# Patient Record
Sex: MALE | Race: BLACK OR AFRICAN AMERICAN | NOT HISPANIC OR LATINO | Employment: FULL TIME | URBAN - METROPOLITAN AREA
[De-identification: names, ages, dates, MRNs, and addresses within clinical notes are randomized per-mention and may not be internally consistent; named-entity substitution may affect disease eponyms.]

---

## 2017-05-08 ENCOUNTER — HOSPITAL ENCOUNTER (EMERGENCY)
Facility: HOSPITAL | Age: 32
Discharge: HOME | End: 2017-05-08
Attending: EMERGENCY MEDICINE

## 2017-05-08 VITALS
OXYGEN SATURATION: 99 % | DIASTOLIC BLOOD PRESSURE: 61 MMHG | RESPIRATION RATE: 16 BRPM | SYSTOLIC BLOOD PRESSURE: 120 MMHG | WEIGHT: 190 LBS | HEART RATE: 76 BPM | HEIGHT: 66 IN | TEMPERATURE: 98 F | BODY MASS INDEX: 30.53 KG/M2

## 2017-05-08 DIAGNOSIS — R11.2 NAUSEA VOMITING AND DIARRHEA: ICD-10-CM

## 2017-05-08 DIAGNOSIS — R10.9 ABDOMINAL PAIN: ICD-10-CM

## 2017-05-08 DIAGNOSIS — R19.7 NAUSEA VOMITING AND DIARRHEA: ICD-10-CM

## 2017-05-08 DIAGNOSIS — R10.84 GENERALIZED ABDOMINAL PAIN: Primary | ICD-10-CM

## 2017-05-08 LAB
ALBUMIN SERPL BCP-MCNC: 4.2 G/DL
ALP SERPL-CCNC: 83 U/L
ALT SERPL W/O P-5'-P-CCNC: 26 U/L
ANION GAP SERPL CALC-SCNC: 9 MMOL/L
AST SERPL-CCNC: 32 U/L
BASOPHILS # BLD AUTO: 0 K/UL
BASOPHILS NFR BLD: 0 %
BILIRUB SERPL-MCNC: 1 MG/DL
BILIRUB UR QL STRIP: NEGATIVE
BUN SERPL-MCNC: 15 MG/DL
CALCIUM SERPL-MCNC: 9.9 MG/DL
CHLORIDE SERPL-SCNC: 105 MMOL/L
CLARITY UR: CLEAR
CO2 SERPL-SCNC: 26 MMOL/L
COLOR UR: YELLOW
CREAT SERPL-MCNC: 1.2 MG/DL
DIFFERENTIAL METHOD: ABNORMAL
EOSINOPHIL # BLD AUTO: 0.1 K/UL
EOSINOPHIL NFR BLD: 3.2 %
ERYTHROCYTE [DISTWIDTH] IN BLOOD BY AUTOMATED COUNT: 12.5 %
EST. GFR  (AFRICAN AMERICAN): >60 ML/MIN/1.73 M^2
EST. GFR  (NON AFRICAN AMERICAN): >60 ML/MIN/1.73 M^2
GLUCOSE SERPL-MCNC: 105 MG/DL
GLUCOSE UR QL STRIP: NEGATIVE
HCT VFR BLD AUTO: 43.8 %
HGB BLD-MCNC: 15.7 G/DL
HGB UR QL STRIP: NEGATIVE
KETONES UR QL STRIP: NEGATIVE
LEUKOCYTE ESTERASE UR QL STRIP: NEGATIVE
LIPASE SERPL-CCNC: 16 U/L
LYMPHOCYTES # BLD AUTO: 1.2 K/UL
LYMPHOCYTES NFR BLD: 30.1 %
MCH RBC QN AUTO: 32 PG
MCHC RBC AUTO-ENTMCNC: 35.8 %
MCV RBC AUTO: 89 FL
MONOCYTES # BLD AUTO: 0.2 K/UL
MONOCYTES NFR BLD: 4.6 %
NEUTROPHILS # BLD AUTO: 2.6 K/UL
NEUTROPHILS NFR BLD: 61.9 %
NITRITE UR QL STRIP: NEGATIVE
PH UR STRIP: >8 [PH] (ref 5–8)
PLATELET # BLD AUTO: 211 K/UL
PMV BLD AUTO: 11.1 FL
POTASSIUM SERPL-SCNC: 3.7 MMOL/L
PROT SERPL-MCNC: 9 G/DL
PROT UR QL STRIP: ABNORMAL
RBC # BLD AUTO: 4.9 M/UL
SODIUM SERPL-SCNC: 140 MMOL/L
SP GR UR STRIP: 1.01 (ref 1–1.03)
URN SPEC COLLECT METH UR: ABNORMAL
UROBILINOGEN UR STRIP-ACNC: 1 EU/DL
WBC # BLD AUTO: 4.12 K/UL

## 2017-05-08 PROCEDURE — 96372 THER/PROPH/DIAG INJ SC/IM: CPT

## 2017-05-08 PROCEDURE — 63600175 PHARM REV CODE 636 W HCPCS: Performed by: NURSE PRACTITIONER

## 2017-05-08 PROCEDURE — 83690 ASSAY OF LIPASE: CPT

## 2017-05-08 PROCEDURE — 81003 URINALYSIS AUTO W/O SCOPE: CPT

## 2017-05-08 PROCEDURE — 80053 COMPREHEN METABOLIC PANEL: CPT

## 2017-05-08 PROCEDURE — 25000003 PHARM REV CODE 250: Performed by: NURSE PRACTITIONER

## 2017-05-08 PROCEDURE — 99284 EMERGENCY DEPT VISIT MOD MDM: CPT | Mod: 25

## 2017-05-08 PROCEDURE — 96374 THER/PROPH/DIAG INJ IV PUSH: CPT

## 2017-05-08 PROCEDURE — 85025 COMPLETE CBC W/AUTO DIFF WBC: CPT

## 2017-05-08 PROCEDURE — 96361 HYDRATE IV INFUSION ADD-ON: CPT

## 2017-05-08 RX ORDER — ONDANSETRON 4 MG/1
4 TABLET, ORALLY DISINTEGRATING ORAL EVERY 8 HOURS PRN
Qty: 10 TABLET | Refills: 0 | Status: SHIPPED | OUTPATIENT
Start: 2017-05-08

## 2017-05-08 RX ORDER — DICYCLOMINE HYDROCHLORIDE 10 MG/ML
20 INJECTION INTRAMUSCULAR
Status: COMPLETED | OUTPATIENT
Start: 2017-05-08 | End: 2017-05-08

## 2017-05-08 RX ORDER — ONDANSETRON 2 MG/ML
4 INJECTION INTRAMUSCULAR; INTRAVENOUS
Status: COMPLETED | OUTPATIENT
Start: 2017-05-08 | End: 2017-05-08

## 2017-05-08 RX ORDER — DICYCLOMINE HYDROCHLORIDE 20 MG/1
20 TABLET ORAL 2 TIMES DAILY
Qty: 20 TABLET | Refills: 0 | Status: SHIPPED | OUTPATIENT
Start: 2017-05-08 | End: 2017-06-07

## 2017-05-08 RX ADMIN — DICYCLOMINE HYDROCHLORIDE 20 MG: 20 INJECTION, SOLUTION INTRAMUSCULAR at 12:05

## 2017-05-08 RX ADMIN — ONDANSETRON 4 MG: 2 INJECTION INTRAMUSCULAR; INTRAVENOUS at 12:05

## 2017-05-08 RX ADMIN — SODIUM CHLORIDE 1000 ML: 0.9 INJECTION, SOLUTION INTRAVENOUS at 12:05

## 2017-05-08 NOTE — ED NOTES
Midline abdominal pain with n/v and diarrhea that started this am.  Reports vomited approx 5 times at home with 2 episodes of loose stools today.  Also reports throat became sore after vomiting and noticed blood tinge in vomit after repeated vomiting.  Denies fever or any other symptoms.  Reports was drinking alcohol yesterday.

## 2017-05-08 NOTE — ED PROVIDER NOTES
"Encounter Date: 5/8/2017       History     Chief Complaint   Patient presents with    Abdominal Pain     abdominal pain, nausea, vomiting, and diarrhea     Review of patient's allergies indicates:  No Known Allergies  HPI Comments: 30yo male with no known health history is here for abd pain, n/v/d.  Pt states he awoke with the generalized abd pain this morning.  He denies trauma, fever, cough, hematemesis, rectal bleeding, dysuria, and penile discharge.  He reports that yesterday he did have fried fish and drink some alcohol, and he noticed the pain only this morning.  No known sick contacts.  He has tried nothing for his symptoms.  Pt reports that after vomiting several times, he noticed "little specks of blood" in the emesis.  He denies history of ulcers and frequent NSAID use.      Patient is a 31 y.o. male presenting with the following complaint: abdominal pain. The history is provided by the patient.   Abdominal Pain   The current episode started 1 to 2 hours ago. The onset of the illness was gradual. The problem has not changed since onset.The abdominal pain is generalized. The abdominal pain does not radiate. The severity of the abdominal pain is 6/10. The abdominal pain is relieved by bowel movement. Exacerbated by: nothing. The other symptoms of the illness include nausea, vomiting and diarrhea. The other symptoms of the illness do not include fever, fatigue, shortness of breath or dysuria.   Symptoms associated with the illness do not include constipation, hematuria or back pain.     History reviewed. No pertinent past medical history.  History reviewed. No pertinent surgical history.  History reviewed. No pertinent family history.  Social History   Substance Use Topics    Smoking status: Never Smoker    Smokeless tobacco: None    Alcohol use Yes      Comment: social     Review of Systems   Constitutional: Negative for activity change, fatigue and fever.   HENT: Negative for congestion and sore throat. "    Respiratory: Negative for cough, chest tightness and shortness of breath.    Cardiovascular: Negative for chest pain.   Gastrointestinal: Positive for abdominal pain, diarrhea, nausea and vomiting. Negative for abdominal distention, blood in stool and constipation.   Genitourinary: Negative for dysuria and hematuria.   Musculoskeletal: Negative for back pain, joint swelling, myalgias and neck pain.   Skin: Negative.    Neurological: Negative for weakness and headaches.   Psychiatric/Behavioral: Negative for confusion.   All other systems reviewed and are negative.      Physical Exam   Initial Vitals   BP Pulse Resp Temp SpO2   05/08/17 1045 05/08/17 1045 05/08/17 1045 05/08/17 1045 05/08/17 1045   130/80 80 16 97.8 °F (36.6 °C) 99 %     Physical Exam    Nursing note and vitals reviewed.  Constitutional: Vital signs are normal. He appears well-developed and well-nourished. He is active and cooperative. He is easily aroused.  Non-toxic appearance. He does not have a sickly appearance. He does not appear ill. No distress.   HENT:   Head: Normocephalic and atraumatic.   Eyes: Conjunctivae are normal.   Neck: Normal range of motion.   Cardiovascular: Normal rate, regular rhythm and normal heart sounds.   Pulmonary/Chest: Effort normal and breath sounds normal.   Abdominal: Soft. Normal appearance and bowel sounds are normal. He exhibits no distension. There is generalized tenderness. There is no rigidity, no rebound, no guarding and no CVA tenderness.   Neurological: He is alert, oriented to person, place, and time and easily aroused. GCS eye subscore is 4. GCS verbal subscore is 5. GCS motor subscore is 6.   Skin: Skin is warm, dry and intact. No bruising and no rash noted.   Psychiatric: He has a normal mood and affect. His speech is normal and behavior is normal. Judgment and thought content normal. Cognition and memory are normal.         ED Course   Procedures  Labs Reviewed   COMPREHENSIVE METABOLIC PANEL -  Abnormal; Notable for the following:        Result Value    Total Protein 9.0 (*)     All other components within normal limits   CBC W/ AUTO DIFFERENTIAL - Abnormal; Notable for the following:     MCH 32.0 (*)     Mono # 0.2 (*)     All other components within normal limits   URINALYSIS - Abnormal; Notable for the following:     pH, UA >8.0 (*)     Protein, UA Trace (*)     All other components within normal limits   LIPASE         Imaging Results         X-Ray Abdomen Flat And Erect (Final result) Result time:  05/08/17 12:32:57    Final result by Jung Olea MD (05/08/17 12:32:57)    Impression:       No acute process.      Electronically signed by: JUNG OLEA MD  Date:     05/08/17  Time:    12:32     Narrative:    [id number]  Accession # [id number]    Study:  XR ABDOMEN FLAT AND ERECT    Indication: Abdominal pain.    Comparison: None.    Findings:   Abdomen flat and erect.    No dilated loops of small bowel.  Nonobstructive bowel gas pattern.  No free air under the diaphragm.  Lung bases are clear.  Osseous structures are unremarkable.                   Medical Decision Making:   Initial Assessment:   32yo male here for abd pain with n/v/d.  No fever, hematemesis, rectal bleeding, dysuria, or hematuria.  Pt appears well, nontoxic.  Abd soft, generalized tenderness, no r/r/g, no distention, bowel sounds normoactive. No CVA tenderness. No signs of trauma.    Differential Diagnosis:   Gastroenteritis, electrolyte derangement, dehydration, UTI, pancreatitis, cholecystitis, appendicitis.   Clinical Tests:   Lab Tests: Ordered and Reviewed  Radiological Study: Ordered and Reviewed  ED Management:  Labs, IV fluids, Abd Xray, IM Bentyl, Zofran  Labs unremarkable.  Xray negative for acute changes.  Pt reports improvement of abd pain.  He is tolerating PO and requesting food and discharge.  I feel the pt has viral gastroenteritis.  No indication of bacterial infection.  Advised clear liquid diet for 24 hours.   F/u with PCP within 2 days and return to the ED if condition changes, progresses, or if there are concerns.  RX Bentyl and Zofran.  Pt verbalized understanding, compliance, and agreement with treatment plan.                Attending Attestation:     Physician Attestation Statement for NP/PA:   I discussed this assessment and plan of this patient with the NP/PA, but I did not personally examine the patient. The face to face encounter was performed by the NP/PA.                  ED Course     Clinical Impression:   The primary encounter diagnosis was Generalized abdominal pain. Diagnoses of Abdominal pain and Nausea vomiting and diarrhea were also pertinent to this visit.    Disposition:   Disposition: Discharged  Condition: Stable       VAL Do  05/08/17 1620       Sushila Singh MD  05/15/17 4937

## 2017-05-08 NOTE — ED AVS SNAPSHOT
[facility]  [address]  [facility] [address]               Rian Felice Lyndon Richter   2017 11:44 AM   ED    Description:  Male : 1985   Department:  [facility]           Your Care was Coordinated By:     Provider Role From To    VAL Do Nurse Practitioner 17 1155 --      Reason for Visit     Abdominal Pain           Diagnoses this Visit        Comments    Generalized abdominal pain    -  Primary     Abdominal pain         Nausea vomiting and diarrhea           ED Disposition     None           To Do List           Follow-up Information     Follow up with [facility]. Schedule an appointment as soon as possible for a visit in 2 days.    Specialty:  Family Medicine    Contact information:    [address], [address]  [facility] [address] [phone]       These Medications        Disp Refills Start End    ondansetron (ZOFRAN-ODT) 4 MG TbDL 10 tablet 0 2017     Take 1 tablet (4 mg total) by mouth every 8 (eight) hours as needed. - Oral    dicyclomine (BENTYL) 20 mg tablet 20 tablet 0 2017    Take 1 tablet (20 mg total) by mouth 2 (two) times daily. PRN abd pain - Oral      [facility]     [facility] Nurse Care Line -  Assistance  Unless otherwise directed by your provider, please contact [facility], our nurse care line that is available for  assistance.     Registered nurses in the [facility] provide: appointment scheduling, clinical advisement, health education, and other advisory services.  Call: [phone] (toll free)               Medications           Message regarding Medications     Verify the changes and/or additions to your medication regime listed below are the same as discussed with your clinician today.  If any of these changes or additions are incorrect, please notify your healthcare provider.        START  "taking these NEW medications        Refills    ondansetron (ZOFRAN-ODT) 4 MG TbDL 0    Sig: Take 1 tablet (4 mg total) by mouth every 8 (eight) hours as needed.    Class: Print    Route: Oral    dicyclomine (BENTYL) 20 mg tablet 0    Sig: Take 1 tablet (20 mg total) by mouth 2 (two) times daily. PRN abd pain    Class: Print    Route: Oral      These medications were administered today        Dose Freq    sodium chloride 0.9% bolus 1,000 mL 1,000 mL ED 1 Time    Sig: Inject 1,000 mLs into the vein ED 1 Time.    Class: Normal    Route: Intravenous    ondansetron injection 4 mg 4 mg ED 1 Time    Sig: Inject 4 mg into the vein ED 1 Time.    Class: Normal    Route: Intravenous    dicyclomine injection 20 mg 20 mg ED 1 Time    Sig: Inject 2 mLs (20 mg total) into the muscle ED 1 Time.    Class: Normal    Route: Intramuscular           Verify that the below list of medications is an accurate representation of the medications you are currently taking.  If none reported, the list may be blank. If incorrect, please contact your healthcare provider. Carry this list with you in case of emergency.           Current Medications     dicyclomine (BENTYL) 20 mg tablet Take 1 tablet (20 mg total) by mouth 2 (two) times daily. PRN abd pain    ondansetron (ZOFRAN-ODT) 4 MG TbDL Take 1 tablet (4 mg total) by mouth every 8 (eight) hours as needed.           Clinical Reference Information           Your Vitals Were     BP Pulse Temp Resp Height Weight    120/61 (BP Location: Right arm, Patient Position: Sitting, BP Method: Automatic) 76 98.3 °F (36.8 °C) (Oral) 16 5' 6" (1.676 m) 86.2 kg (190 lb)    SpO2 BMI             99% 30.67 kg/m2         Allergies as of 5/8/2017     No Known Allergies      Immunizations Administered on Date of Encounter - 5/8/2017     None      ED Micro, Lab, POCT     Start Ordered       Status Ordering Provider    05/08/17 1204 05/08/17 1204  Comprehensive metabolic panel  STAT      Final result     05/08/17 1204 " 05/08/17 1204  CBC auto differential  STAT      Final result     05/08/17 1204 05/08/17 1204  Lipase  STAT      Final result     05/08/17 1204 05/08/17 1204  Urinalysis  STAT      Final result       ED Imaging Orders     Start Ordered       Status Ordering Provider    05/08/17 1205 05/08/17 1204  X-Ray Abdomen Flat And Erect  1 time imaging      Final result         Discharge Instructions         Abdominal Pain    Abdominal pain is pain in the stomach or belly area. Everyone has this pain from time to time. In many cases it goes away on its own. But abdominal pain can sometimes be due to a serious problem, such as appendicitis. So its important to know when to seek help.  Causes of abdominal pain  There are many possible causes of abdominal pain. Common causes in adults include:  · Constipation, diarrhea, or gas  · Stomach acid flowing back up into the esophagus (acid reflux or heartburn)  · Severe acid reflux, called GERD (gastroesophageal reflux disease)  · A sore in the lining of the stomach or small intestine (peptic ulcer)  · Inflammation of the gallbladder, liver, or pancreas  · Gallstones or kidney stones  · Appendicitis   · Intestinal blockage   · An internal organ pushing through a muscle or other tissue (hernia)  · Urinary tract infections  · In women, menstrual cramps, fibroids, or endometriosis  · Inflammation or infection of the intestines  Diagnosing the cause of abdominal pain  Your healthcare provider will do a physical exam help find the cause of your pain. If needed, tests will be ordered. Belly pain has many possible causes. So it can be hard to find the reason for your pain. Giving details about your pain can help. Tell your provider where and when you feel the pain, and what makes it better or worse. Also let your provider know if you have other symptoms such as:  · Fever  · Tiredness  · Upset stomach (nausea)  · Vomiting  · Changes in bathroom habits  Treating abdominal pain  Some causes of  pain need emergency medical treatment right away. These include appendicitis or a bowel blockage. Other problems can be treated with rest, fluids, or medicines. Your healthcare provider can give you specific instructions for treatment or self-care based on what is causing your pain.  If you have vomiting or diarrhea, sip water or other clear fluids. When you are ready to eat solid foods again, start with small amounts of easy-to-digest, low-fat foods. These include apple sauce, toast, or crackers.   When to seek medical care  Call 911 or go to the hospital right away if you:  · Cant pass stool and are vomiting  · Are vomiting blood or have bloody diarrhea or black, tarry diarrhea  · Have chest, neck, or shoulder pain  · Feel like you might pass out  · Have pain in your shoulder blades with nausea  · Have sudden, severe belly pain  · Have new, severe pain unlike any you have felt before  · Have a belly that is rigid, hard, and tender to touch  Call your healthcare provider if you have:  · Pain for more than 5 days  · Bloating for more than 2 days  · Diarrhea for more than 5 days  · A fever of 100.4°F (38.0°C) or higher, or as directed by your provider  · Pain that gets worse  · Weight loss for no reason  · Continued lack of appetite  · Blood in your stool  How to prevent abdominal pain  Here are some tips to help prevent abdominal pain:  · Eat smaller amounts of food at one time.  · Avoid greasy, fried, or other high-fat foods.  · Avoid foods that give you gas.  · Exercise regularly.  · Drink plenty of fluids.  To help prevent GERD symptoms:  · Quit smoking.  · Reduce alcohol and certain foods that increase stomach acid.  · Avoid aspirin and over-the-counter pain and fever medicines (NSAIDS or nonsteroidal anti-inflammatory drugs), if possible  · Lose extra weight.  · Finish eating at least 2 hours before you go to bed or lie down.  · Raise the head of your bed.  Date Last Reviewed: 7/1/2016  © 5242-9747 [employer]  "[employer]. [address] [place] [address], [place] [address]. All rights reserved. This information is not intended as a substitute for professional medical care. Always follow your healthcare professional's instructions.          How to Control Nausea and Vomiting     Taken before meals, medicines can help ease nausea.    Nausea is feeling that you need to throw up. Throwing up occurs when your body forces food that is in your stomach out through your mouth. Nausea and vomiting are symptoms that are caused by many things. They can happen when a condition or disease, medicine, medical treatment, or a poisonous substance affects the area in your brain that controls vomiting. Some conditions or diseases can cause nausea, abdominal pain or cramps, and vomiting. The symptoms can be mild and go away by themselves. Other symptoms can be serious. You will need to see your healthcare provider for these.  Nausea and vomiting are common. They can be caused by many things. These include:  · "Stomach flu" (gastroenteritis)  · Food poisoning  · Stomach pain (gastritis)  · Blockages  They can also be caused by a head injury, an infection in the brain or inside the ear, or migraines. Other common causes of nausea and vomiting include:  · Brain tumor  · Brain bruise  · Motion sickness  · Drugs. These include alcohol, pain medicines such as morphine, and cancer medicines.  · Toxins. These are poisonous things like plants or liquids that are swallowed by accident.  · Advanced types of cancer  · Movement problems (psychogenic problems)  · Extra pressure in the fluid that surrounds the brain and spinal cord (elevated intracranial pressure)     Nausea and vomiting are also common side effects of chemotherapy and radiation therapy. Side effects happen when treatment changes some normal cells as well as cancer cells. In this case, the cells lining your stomach and the part of your brain that controls vomiting are affected. Other more " serious causes of vomiting may be hard to find early in the illness.     When to seek medical advice  Call your healthcare provider right away if you have the following:  · Nausea or vomiting that lasts 24 hours or more  · Trouble keeping fluids down   Medicines can help  Nausea or vomiting can often be prevented or controlled with medicines (antiemetics). Your doctor may give you antiemetics before or after treatment if you are getting chemotherapy or other medical treatments that cause nausea or vomiting.  Eating tips  · If you have medicines to control nausea, take them before meals as directed.  · Avoid fatty or greasy foods while nauseated.  · Eat small meals slowly throughout the day.  · Ask someone to sit with you while you eat to keep you from thinking about feeling nauseated.  · Eat foods at room temperature or colder to avoid strong smells.  · Eat dry foods, such as toast, crackers, or pretzels. Also eat cool, light foods, such as applesauce, and bland foods, such as oatmeal or skinned chicken.   Other ways to feel better  · Get a little fresh air. Take a short walk.  · Talk to a friend, listen to music, or watch TV.  · Take a few deep, slow breaths.  · Eat by candlelight or in surroundings that you find relaxing.  · Use a technique, such as guided imagery, to help you relax. Imagine yourself in a beautiful, restful scene. Or daydream about the place youd most like to be.  Date Last Reviewed: 1/6/2016  © 2133-0453 [employer]. [address] [place] [address], [place] [address]. All rights reserved. This information is not intended as a substitute for professional medical care. Always follow your healthcare professional's instructions.          Self-Care for Vomiting and Diarrhea    Vomiting and diarrhea can make you miserable. Your stomach and bowels are reacting to an irritant. This might be food, medicine, or viral stomach flu. Vomiting and diarrhea are two ways your body can remove the problem  from your system. Nausea is a symptom that discourages you from eating. This gives your stomach and bowels time to recover. To get back to normal, start with self-care to ease your discomfort.  Drink liquids  Drink or sip liquids to avoid losing too much fluid (dehydration):  · Clear liquids such as water or broth are the best choices.  · Do not drink beverages with a lot of sugar in them, such as juices and sodas. These can make diarrhea worse.  · If you have severe vomiting, do not drink sport drinks, such as electrolyte solutions. These don't have the right mix of water, sugar, and minerals. They can also make the symptoms worse. In this situation, commercially available oral rehydration solutions are best.   · Suck on ice chips if the thought of drinking something makes you queasy.  When youre able to eat again  Tips include the following:  · As your appetite comes back, you can resume your normal diet.  · Ask your healthcare provider whether you should stay away from any foods.  Medicines  Know the following about medicines:  · Vomiting and diarrhea are ways your body uses to rid itself of harmful substances such as bacteria. DO NOT use antidiarrheal or antivomiting (antiemetic) medicines unless your healthcare provider tells you to do so.  · Aspirin, medicine with aspirin, and many aspirin substitutes can irritate your stomach. So avoid them when you have stomach upset.  · Certain prescription and over-the-counter medicines can cause vomiting and diarrhea. Talk with your healthcare provider about any medicines you take that may be causing these symptoms.  · Certain over-the-counter antihistamines can help control nausea. Other medicines can help soothe stomach upset. Ask your healthcare provider which medicines may help you.  When to call your healthcare provider  Call your healthcare provider if you have:  · Bloody or black vomit or stools  · Severe, steady belly pain  · Vomiting with a severe headache or  vomiting after a head injury  · Vomiting and diarrhea together for more than an hour  · An inability to hold down even sips of liquids for more than 12 hours  · Vomiting that lasts more than 24 hours  · Severe diarrhea that lasts more than 2 days  · Yellowish color to your skin or the whites of your eyes  · Inability to urinate. In infants and young children, not making wet diapers.    Date Last Reviewed: 6/1/2016  © 5741-5699 [employer]. [address] [place] [address], [place] [address]. All rights reserved. This information is not intended as a substitute for professional medical care. Always follow your healthcare professional's instructions.          [facility] Sign-Up     Activating your [facility] account is as easy as 1-2-3!     1) Visit [email or web address], select Sign Up Now, enter this activation code and your date of birth, then select Next.  [email or web address]  Expires: 6/22/2017  1:19 PM      2) Create a username and password to use when you visit [facility] in the future and select a security question in case you lose your password and select Next.    3) Enter your e-mail address and click Sign Up!    Additional Information  If you have questions, please e-mail [email or web address] or call [phone] to talk to our [facility] staff. Remember, [facility] is NOT to be used for urgent needs. For medical emergencies, dial 911.          [facility] complies with applicable Federal civil rights laws and does not discriminate on the basis of race, color, national origin, age, disability, or sex.        Language Assistance Services     ATTENTION: Language assistance services are available, free of charge. Please call [phone].      ATENCIÓN: Si habla español, tiene a meehan disposición servicios gratuitos de asistencia lingüística. Llame al [phone].     CHÚ Ý: N?u b?n nói Ti?ng Vi?t, có các d?ch v? h? tr? ngôn ng? mi?n phí dành cho b?n. G?i s? [phone].

## 2017-05-08 NOTE — DISCHARGE INSTRUCTIONS
Abdominal Pain    Abdominal pain is pain in the stomach or belly area. Everyone has this pain from time to time. In many cases it goes away on its own. But abdominal pain can sometimes be due to a serious problem, such as appendicitis. So its important to know when to seek help.  Causes of abdominal pain  There are many possible causes of abdominal pain. Common causes in adults include:  · Constipation, diarrhea, or gas  · Stomach acid flowing back up into the esophagus (acid reflux or heartburn)  · Severe acid reflux, called GERD (gastroesophageal reflux disease)  · A sore in the lining of the stomach or small intestine (peptic ulcer)  · Inflammation of the gallbladder, liver, or pancreas  · Gallstones or kidney stones  · Appendicitis   · Intestinal blockage   · An internal organ pushing through a muscle or other tissue (hernia)  · Urinary tract infections  · In women, menstrual cramps, fibroids, or endometriosis  · Inflammation or infection of the intestines  Diagnosing the cause of abdominal pain  Your healthcare provider will do a physical exam help find the cause of your pain. If needed, tests will be ordered. Belly pain has many possible causes. So it can be hard to find the reason for your pain. Giving details about your pain can help. Tell your provider where and when you feel the pain, and what makes it better or worse. Also let your provider know if you have other symptoms such as:  · Fever  · Tiredness  · Upset stomach (nausea)  · Vomiting  · Changes in bathroom habits  Treating abdominal pain  Some causes of pain need emergency medical treatment right away. These include appendicitis or a bowel blockage. Other problems can be treated with rest, fluids, or medicines. Your healthcare provider can give you specific instructions for treatment or self-care based on what is causing your pain.  If you have vomiting or diarrhea, sip water or other clear fluids. When you are ready to eat solid foods again,  start with small amounts of easy-to-digest, low-fat foods. These include apple sauce, toast, or crackers.   When to seek medical care  Call 911 or go to the hospital right away if you:  · Cant pass stool and are vomiting  · Are vomiting blood or have bloody diarrhea or black, tarry diarrhea  · Have chest, neck, or shoulder pain  · Feel like you might pass out  · Have pain in your shoulder blades with nausea  · Have sudden, severe belly pain  · Have new, severe pain unlike any you have felt before  · Have a belly that is rigid, hard, and tender to touch  Call your healthcare provider if you have:  · Pain for more than 5 days  · Bloating for more than 2 days  · Diarrhea for more than 5 days  · A fever of 100.4°F (38.0°C) or higher, or as directed by your provider  · Pain that gets worse  · Weight loss for no reason  · Continued lack of appetite  · Blood in your stool  How to prevent abdominal pain  Here are some tips to help prevent abdominal pain:  · Eat smaller amounts of food at one time.  · Avoid greasy, fried, or other high-fat foods.  · Avoid foods that give you gas.  · Exercise regularly.  · Drink plenty of fluids.  To help prevent GERD symptoms:  · Quit smoking.  · Reduce alcohol and certain foods that increase stomach acid.  · Avoid aspirin and over-the-counter pain and fever medicines (NSAIDS or nonsteroidal anti-inflammatory drugs), if possible  · Lose extra weight.  · Finish eating at least 2 hours before you go to bed or lie down.  · Raise the head of your bed.  Date Last Reviewed: 7/1/2016  © 4381-0149 [employer]. [address] [place] [address], [place] [address]. All rights reserved. This information is not intended as a substitute for professional medical care. Always follow your healthcare professional's instructions.          How to Control Nausea and Vomiting     Taken before meals, medicines can help ease nausea.    Nausea is feeling that you need to throw up. Throwing up occurs when your  "body forces food that is in your stomach out through your mouth. Nausea and vomiting are symptoms that are caused by many things. They can happen when a condition or disease, medicine, medical treatment, or a poisonous substance affects the area in your brain that controls vomiting. Some conditions or diseases can cause nausea, abdominal pain or cramps, and vomiting. The symptoms can be mild and go away by themselves. Other symptoms can be serious. You will need to see your healthcare provider for these.  Nausea and vomiting are common. They can be caused by many things. These include:  · "Stomach flu" (gastroenteritis)  · Food poisoning  · Stomach pain (gastritis)  · Blockages  They can also be caused by a head injury, an infection in the brain or inside the ear, or migraines. Other common causes of nausea and vomiting include:  · Brain tumor  · Brain bruise  · Motion sickness  · Drugs. These include alcohol, pain medicines such as morphine, and cancer medicines.  · Toxins. These are poisonous things like plants or liquids that are swallowed by accident.  · Advanced types of cancer  · Movement problems (psychogenic problems)  · Extra pressure in the fluid that surrounds the brain and spinal cord (elevated intracranial pressure)     Nausea and vomiting are also common side effects of chemotherapy and radiation therapy. Side effects happen when treatment changes some normal cells as well as cancer cells. In this case, the cells lining your stomach and the part of your brain that controls vomiting are affected. Other more serious causes of vomiting may be hard to find early in the illness.     When to seek medical advice  Call your healthcare provider right away if you have the following:  · Nausea or vomiting that lasts 24 hours or more  · Trouble keeping fluids down   Medicines can help  Nausea or vomiting can often be prevented or controlled with medicines (antiemetics). Your doctor may give you antiemetics before " or after treatment if you are getting chemotherapy or other medical treatments that cause nausea or vomiting.  Eating tips  · If you have medicines to control nausea, take them before meals as directed.  · Avoid fatty or greasy foods while nauseated.  · Eat small meals slowly throughout the day.  · Ask someone to sit with you while you eat to keep you from thinking about feeling nauseated.  · Eat foods at room temperature or colder to avoid strong smells.  · Eat dry foods, such as toast, crackers, or pretzels. Also eat cool, light foods, such as applesauce, and bland foods, such as oatmeal or skinned chicken.   Other ways to feel better  · Get a little fresh air. Take a short walk.  · Talk to a friend, listen to music, or watch TV.  · Take a few deep, slow breaths.  · Eat by candlelight or in surroundings that you find relaxing.  · Use a technique, such as guided imagery, to help you relax. Imagine yourself in a beautiful, restful scene. Or daydream about the place youd most like to be.  Date Last Reviewed: 1/6/2016  © 8517-8909 [employer]. [address] [place] [address], [place] [address]. All rights reserved. This information is not intended as a substitute for professional medical care. Always follow your healthcare professional's instructions.          Self-Care for Vomiting and Diarrhea    Vomiting and diarrhea can make you miserable. Your stomach and bowels are reacting to an irritant. This might be food, medicine, or viral stomach flu. Vomiting and diarrhea are two ways your body can remove the problem from your system. Nausea is a symptom that discourages you from eating. This gives your stomach and bowels time to recover. To get back to normal, start with self-care to ease your discomfort.  Drink liquids  Drink or sip liquids to avoid losing too much fluid (dehydration):  · Clear liquids such as water or broth are the best choices.  · Do not drink beverages with a lot of sugar in them, such as  juices and sodas. These can make diarrhea worse.  · If you have severe vomiting, do not drink sport drinks, such as electrolyte solutions. These don't have the right mix of water, sugar, and minerals. They can also make the symptoms worse. In this situation, commercially available oral rehydration solutions are best.   · Suck on ice chips if the thought of drinking something makes you queasy.  When youre able to eat again  Tips include the following:  · As your appetite comes back, you can resume your normal diet.  · Ask your healthcare provider whether you should stay away from any foods.  Medicines  Know the following about medicines:  · Vomiting and diarrhea are ways your body uses to rid itself of harmful substances such as bacteria. DO NOT use antidiarrheal or antivomiting (antiemetic) medicines unless your healthcare provider tells you to do so.  · Aspirin, medicine with aspirin, and many aspirin substitutes can irritate your stomach. So avoid them when you have stomach upset.  · Certain prescription and over-the-counter medicines can cause vomiting and diarrhea. Talk with your healthcare provider about any medicines you take that may be causing these symptoms.  · Certain over-the-counter antihistamines can help control nausea. Other medicines can help soothe stomach upset. Ask your healthcare provider which medicines may help you.  When to call your healthcare provider  Call your healthcare provider if you have:  · Bloody or black vomit or stools  · Severe, steady belly pain  · Vomiting with a severe headache or vomiting after a head injury  · Vomiting and diarrhea together for more than an hour  · An inability to hold down even sips of liquids for more than 12 hours  · Vomiting that lasts more than 24 hours  · Severe diarrhea that lasts more than 2 days  · Yellowish color to your skin or the whites of your eyes  · Inability to urinate. In infants and young children, not making wet diapers.    Date Last  Reviewed: 6/1/2016  © 0438-9342 [employer], [employer]. [address] [place] [address], [place] [address]. All rights reserved. This information is not intended as a substitute for professional medical care. Always follow your healthcare professional's instructions.

## 2024-10-02 PROBLEM — Z00.00 ENCOUNTER FOR PREVENTIVE HEALTH EXAMINATION: Status: ACTIVE | Noted: 2024-10-02

## 2024-10-04 ENCOUNTER — APPOINTMENT (OUTPATIENT)
Dept: PLASTIC SURGERY | Facility: CLINIC | Age: 39
End: 2024-10-04
Payer: COMMERCIAL

## 2024-10-04 VITALS — BODY MASS INDEX: 31.18 KG/M2 | HEIGHT: 66 IN | WEIGHT: 194 LBS

## 2024-10-04 DIAGNOSIS — M95.0 ACQUIRED DEFORMITY OF NOSE: ICD-10-CM

## 2024-10-04 PROCEDURE — 99204 OFFICE O/P NEW MOD 45 MIN: CPT

## 2024-10-04 NOTE — ASSESSMENT
[FreeTextEntry1] : 38-year-old male to female pronouns she/her presents for evaluation for rhinoplasty.  Patient has been transitioning for 2 years on hormone has past surghx of top and bottom surgery.   - explained to pt  I do not believe I can achieve the results she is interested - explained to pt I will refer her to a physician that can.  - I will have my office contact her with physician name.  A total of 45 mins was spent with this encounter.

## 2024-10-04 NOTE — PHYSICAL EXAM
[de-identified] : Nose: dorsum midline, no saddle deformity, tip projection midline, no alar collapse, Lee test negative

## 2024-10-04 NOTE — HISTORY OF PRESENT ILLNESS
[FreeTextEntry1] : 38-year-old male to female patient pronouns she her presents for evaluation for rhinoplasty.  Patient has been transitioning for 2 years.  Patient has had bottom surgery and top surgery.  Patient has been taking hormones for 2 years.  Patient is interested in rhinoplasty surgery.  Patient states that she had approval for rhinoplasty in [place] but decided to come to [place] for her other surgeries and did not proceed with her rhinoplasty surgery.  Patient is concerned with her alar flaring.  Patient is also concerned with widening of her dorsum.  No other complaints at this time

## 2024-12-16 ENCOUNTER — APPOINTMENT (OUTPATIENT)
Dept: PLASTIC SURGERY | Facility: CLINIC | Age: 39
End: 2024-12-16

## 2024-12-16 VITALS — HEIGHT: 66 IN | BODY MASS INDEX: 33.43 KG/M2 | WEIGHT: 208 LBS

## 2024-12-16 DIAGNOSIS — F64.9 GENDER IDENTITY DISORDER, UNSPECIFIED: ICD-10-CM

## 2024-12-16 PROCEDURE — 99205 OFFICE O/P NEW HI 60 MIN: CPT

## 2024-12-29 NOTE — REASON FOR VISIT
[Consultation] : a consultation visit [FreeTextEntry1] : Neil is a transgender woman who was assigned male at birth and has experienced gender dysphoria related to her facial features. She has been living as a woman and undergoing hormone therapy for the past two years. In 2024, she had bottom surgery performed by Dr. Jorge MD, and breast augmentation by Dr. Gavino MD, both at [facility]. Neil is a nonsmoker and has not used Botox, fillers, or silicone.

## 2024-12-29 NOTE — HISTORY OF PRESENT ILLNESS
[FreeTextEntry1] : This marianne patient began transitioning since 8/1/2022. She has been on hormonal therapy consistently since 8/1/2022. She has been in therapy and was diagnosed with Gender Dysphoria concerned about certain facial features that appear masculine and cause bullying desires facial feminization surgery followed by Transgender team. The following facial features appear masculine and will need to be modified: -Brow -Nose -Jawline -Neck -cheeks -Lips   Allergies: - None   Meds: - Estrogen injection every 3 months. - Estrogen injection every 1 week. - Biktarvy  PMHx: - HIV+ - Depression   Surgical History Surgery Type: Bottom Surgery Location: [facility] Surgeon: Jorge COTTON Year: 2024 Complications?: "little tissue left over" - planning to have revision.  Surgery Type: Breast augmentation Location: [place], [place] Surgeon: MD Gavino Year: 2024 Complications?: None   Denies previous botox, fillers or silicone injections.  SH: Denies marijuana use, Denies Cigarette use   ROS: General health / Constitutional: no fever, no chills, no unusual weight changes, no energy level changes, no night sweats Skin: No color or pigmentation changes, no pruritis, no rashes, no ulcers, Hair: No changes in color, texture, distribution, loss Nails: No color changes, brittleness, infection Head: No headaches, no new jaw pain Eyes: Good visual acuity, no color blindness, no corrective lenses, no photophobia, no diplopia, no blurred vision, no infection, pain, no medications, Ear: no tinnitus, no discharge, no pain, no medications Nose: no epistaxis, no rhinorrhea, no rhinitis, no pain, Mouth & Throat: no gingivitis, no gingival bleeding, no ulcers, no voice changes, no changes in oral mucosa or tongue Neck no stiffness, no pain, no lymphadenitis, no thyroid disorders, Respiratory: no cough, no dyspnea, no wheezing, no chest pain, cyanosis, no pneumonia, no asthma, Cardiovascular: no chest pain, no palpitations, no irregular rhythm, no tachycardia, no bradycardia, no heart failure, no dyspnea on exertion (MONTANO), no edema Gastrointestinal: no nausea / vomiting, no dysphagia, no reflux / GERD, no abdominal pain, no jaundice Musculoskeletal: no pain in muscles, bones, or joints; no fractures, no dislocations, no muscular weakness, no atrophy Neurological: no paresis, no paralysis, no paresthesia, no seizures, no dizziness, no syncope, no ataxia, no tremor Psychological: no childhood behavioral problems, no irritability, no sleep changes Hematological: no anemia, no bruising, no bleeding tendencies.   PHYSICAL EXAM General: WDWN, in no distress, A & O x 3 (person, place, time) HEENT Head: AT/NC (atraumatic, normocephalic), including TMJ, sensory and motor; + Prominent brow and lateral orbital rim type III. Eyes: EOMI, PERRLA Ears: exterior, nl hearing, Nose: + slightly wide, bulbous nasal tip with acute nasolabial angle intranasal exam showed enlarged turbinates and deviated caudal septum Throat & mouth: gd palate elevation, nl tongue mobility, nl tonsil size; + Prominent mandibular angle with active masseter, boxy wide chin, Hypoplastic cheeks, Hypoplastic upper and lower lips, deep nasolabial folds. Neck: no masses, nl pulses, no prominent tracheal bulge ("Stew's Apple"), excess submental fat.     First stage FFS: 21139: Frontal sinus anterior wall setback 58869: Orbital reconstruction bilateral 67723: Hairline lowering 29745: Browlift bilateral 23179: Supraorbital bar recontouring bilateral 29797: Tarsorrhaphy bilateral 97132: Mandibular angle contouring 97762-91: Mandibular angle osteotomy 49911: Osseous genioplasty narrowing, shortening 04973: Rhinoplasty open 56046: Submucous resection of septum 42216: Cartilage grafting for nasal reconstruction, use of cadaveric cartilage for  grafts, columellar strut, tip graft 47938: Submental fat excision 06584: platysmaplasty 40799 x 2: Upper and lower lip augmentation with temporalis fascia 95971: Fat grafting to malar and nasolabial fold regions bilateral from abdomen first 25 ccs       21139 (Frontal sinus setback): Previous exposure to testosterone has led this patient to have a male appearing forehead with a more bossed shaped; this is opposed to a female appearing forehead that is more flat. A frontal sinus setback procedure will reshape the anterior wall of the frontal sinus by pushing back the bone and change the contour from convex (male-shape) to flat (female-shape). This surgical change will create a more flattened feminine brow appearance.          78940 (hairline lowering) and 77133 (Browlift): Previous exposure to testosterone has led to the patient having a male M-shaped hairline as opposed to a female upside-down U-shaped hairline. Her receded hairline also creates a high, broad male appearing forehead. Her low set eyebrows on top of her orbital roof rim give her a martinez, male-appearing look. Both of these male traits: a high hairline and low-set brows are causing her intense feelings of dysphoria. She would benefit from bilateral browlift and hairline lowering procedures. Raising her lateral eyebrow with a bilateral browlift will create a more female appearance. She has stressed a strong desire to wear her own natural hair and does not want to always have to wear a wig to cover up her male features.          96545 (bilateral orbital reconstruction): The bone growth that occurred during testosterone exposure in the upper half of each orbital has caused this patient to have male appearing orbital features that contribute to the sense of dysphoria she feels in public. Orbital reconstruction with a reciprocating saw for an L-shaped ostectomy, on both sides will help remove the excessive bony protrusion; this will be followed by bone material grafting and resorbable plate fixation. The bilateral orbital reconstruction will help alleviate these orbital and upper facial male features.          82057 (Supraorbital bar contouring): This patient has orbital lateral hooding or overhang of her lateral frontal bone which is typically associated with the male skull and orbits. Supraorbital contouring of this lateral orbital region with a pineapple alonzo will correct this male feature that is causing this patient dysphoria. These procedures also allows us to do a success browlift procedure since the overhang of bone can inhibit the upper movement of the brow and the removal of this allows for an effective lift.          83288 (Tarsorrhaphy): Bilateral tarsorrhaphy or surgical closure of both eyelids, after protective lacrilube or perilube ointment is applied, is necessary for the safety of the patients globes. With the brow reshaping and browlift procedure the upper eyelid will be pulled up so the globe will be exposed and unprotected during this long, proposed procedure. The tarsorrhaphies, allows both globes to be protected so the complications of corneal abrasions may be prevented.          04843-60 (mandibular angle osteotomy) and 21209 (mandibular angle contouring): This patient has an angular, more boxy jaw which results in male appearing lower face. She also has increased lower facial width related to her mandibular angle lateral projection. Mandibular angle resection and contouring will create a more feminine triangular jaw. By having a mandibular angle reduction through both of these procedures, the lower facial width is narrowed and this will create a V-shaped, feminine appearance of the jawline when viewed from the front.          47516 (osseous genioplasty): This patient has a wide, large chin that contributes to her feelings of gender dysphoria and being mis-gendered in public.  The patient would benefit from an osseous genioplasty that narrows and shortens the chin. The osseous genioplasty will help create a more feminine and more, slender chin.          34182 (Rhinoplasty): This patients nose has characteristics of a male nose. The male nose is often larger and wider with a more bulbous nasal tip. These male nasal features make her feel masculine which exacerbates her gender dysphoria. A rhinoplasty would be beneficial to feminize her nose by creating a smaller nose and more delicate, softer nasal tip. The lateral dorsal shape will also be feminized by the rhinoplasty.          94609 (Submucous resection of nose): This patients nasal septum is shaped like a male septum. The septum is the supportive pole that holds up the structure of the nasal pyramid. In this case the septum will also be used to provide cartilage tissue necessary for nasal grafts. This septoplasty is required to modify the septum to create a straight nose with good functional breathing while taking away the characteristics of a male nose.          73456 (Cartilage grafting for nasal reconstruction): Cartilage grafting is crucial for the performance of the feminizing rhinoplasty. To do that, cartilage grafts including, bilateral  grafts, a columellar strut graft and nasal tip graft are necessary.            78619 (Submental fat removal): Testosterone exposure will build fibrofatty tissue in the submental region that is not corrected by hormone therapy. This patient has this fibrofatty tissue in the submental region which has created a masculine lateral profile appearance. Direct submental fibrofatty tissue excision is necessary to correct this male feature.          11199 (Platysmaplasty): With prolonged testosterone exposure, the submental region will appear full and ptotic. This patient has a full and ptotic submental and neck region which is associated with a male-appearing neck. The female neck is slender and tight. The platsymaplasty, performed after submental fat excision, will help create a slender and tight, female appearing neck.          40799 x 2 (Upper and lower lip augmentation): Females are shown to have parsons lips than males, therefore, an upper and lower lip augmentation will create a more feminine appearance for this patient as she has currently has hypoplastic lips. Lip augmentation is also a procedure that not all patients need but we deem that this procedure is necessary for this patient as it well help alleviate her gender dysphoria.         00340 (Fat grafting to malar / nasolabial fold facial regions): This patient had prolonged testosterone exposure resulting in male mid-face features with depressed soft tissues in the cheeks and nasolabial fold region. More fullness in the cheek and nasolabial fold region may be created with fat grafting from the abdomen or hips to the cheek and nasolabial fold region creating a more full, feminine appearance. The Saenz fat grafting technique with atraumatic harvest and grafting leads to a 70%+ take of fat grafting to this region and is suggested. This procedure will correct the hypoplastic cheeks snd deep nasolabial folds.   Needs a 3D CT scan and Virtual Surgical Planning. She will need to provide a letter from her therapist and hormone provider. Will need PCP clearance.  We had a 60 minute meeting with the patient discussing diagnosis and medical management issues and outcomes.  Patient seen in conjunction with Dr. Chace Lea.

## 2024-12-29 NOTE — HISTORY OF PRESENT ILLNESS
[FreeTextEntry1] : This marianne patient began transitioning since 8/1/2022. She has been on hormonal therapy consistently since 8/1/2022. She has been in therapy and was diagnosed with Gender Dysphoria concerned about certain facial features that appear masculine and cause bullying desires facial feminization surgery followed by Transgender team. The following facial features appear masculine and will need to be modified: -Brow -Nose -Jawline -Neck -cheeks -Lips   Allergies: - None   Meds: - Estrogen injection every 3 months. - Estrogen injection every 1 week. - Biktarvy  PMHx: - HIV+ - Depression   Surgical History Surgery Type: Bottom Surgery Location: [facility] Surgeon: Jorge COTTON Year: 2024 Complications?: "little tissue left over" - planning to have revision.  Surgery Type: Breast augmentation Location: [place], [place] Surgeon: MD Gavino Year: 2024 Complications?: None   Denies previous botox, fillers or silicone injections.  SH: Denies marijuana use, Denies Cigarette use   ROS: General health / Constitutional: no fever, no chills, no unusual weight changes, no energy level changes, no night sweats Skin: No color or pigmentation changes, no pruritis, no rashes, no ulcers, Hair: No changes in color, texture, distribution, loss Nails: No color changes, brittleness, infection Head: No headaches, no new jaw pain Eyes: Good visual acuity, no color blindness, no corrective lenses, no photophobia, no diplopia, no blurred vision, no infection, pain, no medications, Ear: no tinnitus, no discharge, no pain, no medications Nose: no epistaxis, no rhinorrhea, no rhinitis, no pain, Mouth & Throat: no gingivitis, no gingival bleeding, no ulcers, no voice changes, no changes in oral mucosa or tongue Neck no stiffness, no pain, no lymphadenitis, no thyroid disorders, Respiratory: no cough, no dyspnea, no wheezing, no chest pain, cyanosis, no pneumonia, no asthma, Cardiovascular: no chest pain, no palpitations, no irregular rhythm, no tachycardia, no bradycardia, no heart failure, no dyspnea on exertion ([place]), no edema Gastrointestinal: no nausea / vomiting, no dysphagia, no reflux / GERD, no abdominal pain, no jaundice Musculoskeletal: no pain in muscles, bones, or joints; no fractures, no dislocations, no muscular weakness, no atrophy Neurological: no paresis, no paralysis, no paresthesia, no seizures, no dizziness, no syncope, no ataxia, no tremor Psychological: no childhood behavioral problems, no irritability, no sleep changes Hematological: no anemia, no bruising, no bleeding tendencies.   PHYSICAL EXAM General: WDWN, in no distress, A & O x 3 (person, place, time) HEENT Head: AT/NC (atraumatic, normocephalic), including TMJ, sensory and motor; + Prominent brow and lateral orbital rim type III. Eyes: EOMI, PERRLA Ears: exterior, nl hearing, Nose: + slightly wide, bulbous nasal tip with acute nasolabial angle intranasal exam showed enlarged turbinates and deviated caudal septum Throat & mouth: gd palate elevation, nl tongue mobility, nl tonsil size; + Prominent mandibular angle with active masseter, boxy wide chin, Hypoplastic cheeks, Hypoplastic upper and lower lips, deep nasolabial folds. Neck: no masses, nl pulses, no prominent tracheal bulge ("Stew's Apple"), excess submental fat.     First stage FFS: 21139: Frontal sinus anterior wall setback 40110: Orbital reconstruction bilateral 99520: Hairline lowering 19513: Browlift bilateral 84536: Supraorbital bar recontouring bilateral 59219: Tarsorrhaphy bilateral 48636: Mandibular angle contouring 62507-35: Mandibular angle osteotomy 93677: Osseous genioplasty narrowing, shortening 09819: Rhinoplasty open 49845: Submucous resection of septum 92204: Cartilage grafting for nasal reconstruction, use of cadaveric cartilage for  grafts, columellar strut, tip graft 59224: Submental fat excision 78415: platysmaplasty 40799 x 2: Upper and lower lip augmentation with temporalis fascia 50703: Fat grafting to malar and nasolabial fold regions bilateral from abdomen first 25 ccs       21139 (Frontal sinus setback): Previous exposure to testosterone has led this patient to have a male appearing forehead with a more bossed shaped; this is opposed to a female appearing forehead that is more flat. A frontal sinus setback procedure will reshape the anterior wall of the frontal sinus by pushing back the bone and change the contour from convex (male-shape) to flat (female-shape). This surgical change will create a more flattened feminine brow appearance.          17122 (hairline lowering) and 59261 (Browlift): Previous exposure to testosterone has led to the patient having a male M-shaped hairline as opposed to a female upside-down U-shaped hairline. Her receded hairline also creates a high, broad male appearing forehead. Her low set eyebrows on top of her orbital roof rim give her a martinez, male-appearing look. Both of these male traits: a high hairline and low-set brows are causing her intense feelings of dysphoria. She would benefit from bilateral browlift and hairline lowering procedures. Raising her lateral eyebrow with a bilateral browlift will create a more female appearance. She has stressed a strong desire to wear her own natural hair and does not want to always have to wear a wig to cover up her male features.          11250 (bilateral orbital reconstruction): The bone growth that occurred during testosterone exposure in the upper half of each orbital has caused this patient to have male appearing orbital features that contribute to the sense of dysphoria she feels in public. Orbital reconstruction with a reciprocating saw for an L-shaped ostectomy, on both sides will help remove the excessive bony protrusion; this will be followed by bone material grafting and resorbable plate fixation. The bilateral orbital reconstruction will help alleviate these orbital and upper facial male features.          73950 (Supraorbital bar contouring): This patient has orbital lateral hooding or overhang of her lateral frontal bone which is typically associated with the male skull and orbits. Supraorbital contouring of this lateral orbital region with a pineapple alonzo will correct this male feature that is causing this patient dysphoria. These procedures also allows us to do a success browlift procedure since the overhang of bone can inhibit the upper movement of the brow and the removal of this allows for an effective lift.          02317 (Tarsorrhaphy): Bilateral tarsorrhaphy or surgical closure of both eyelids, after protective lacrilube or perilube ointment is applied, is necessary for the safety of the patients globes. With the brow reshaping and browlift procedure the upper eyelid will be pulled up so the globe will be exposed and unprotected during this long, proposed procedure. The tarsorrhaphies, allows both globes to be protected so the complications of corneal abrasions may be prevented.          79861-68 (mandibular angle osteotomy) and 21209 (mandibular angle contouring): This patient has an angular, more boxy jaw which results in male appearing lower face. She also has increased lower facial width related to her mandibular angle lateral projection. Mandibular angle resection and contouring will create a more feminine triangular jaw. By having a mandibular angle reduction through both of these procedures, the lower facial width is narrowed and this will create a V-shaped, feminine appearance of the jawline when viewed from the front.          59173 (osseous genioplasty): This patient has a wide, large chin that contributes to her feelings of gender dysphoria and being mis-gendered in public.  The patient would benefit from an osseous genioplasty that narrows and shortens the chin. The osseous genioplasty will help create a more feminine and more, slender chin.          72307 (Rhinoplasty): This patients nose has characteristics of a male nose. The male nose is often larger and wider with a more bulbous nasal tip. These male nasal features make her feel masculine which exacerbates her gender dysphoria. A rhinoplasty would be beneficial to feminize her nose by creating a smaller nose and more delicate, softer nasal tip. The lateral dorsal shape will also be feminized by the rhinoplasty.          17738 (Submucous resection of nose): This patients nasal septum is shaped like a male septum. The septum is the supportive pole that holds up the structure of the nasal pyramid. In this case the septum will also be used to provide cartilage tissue necessary for nasal grafts. This septoplasty is required to modify the septum to create a straight nose with good functional breathing while taking away the characteristics of a male nose.          68786 (Cartilage grafting for nasal reconstruction): Cartilage grafting is crucial for the performance of the feminizing rhinoplasty. To do that, cartilage grafts including, bilateral  grafts, a columellar strut graft and nasal tip graft are necessary.            68004 (Submental fat removal): Testosterone exposure will build fibrofatty tissue in the submental region that is not corrected by hormone therapy. This patient has this fibrofatty tissue in the submental region which has created a masculine lateral profile appearance. Direct submental fibrofatty tissue excision is necessary to correct this male feature.          65535 (Platysmaplasty): With prolonged testosterone exposure, the submental region will appear full and ptotic. This patient has a full and ptotic submental and neck region which is associated with a male-appearing neck. The female neck is slender and tight. The platsymaplasty, performed after submental fat excision, will help create a slender and tight, female appearing neck.          40799 x 2 (Upper and lower lip augmentation): Females are shown to have parsons lips than males, therefore, an upper and lower lip augmentation will create a more feminine appearance for this patient as she has currently has hypoplastic lips. Lip augmentation is also a procedure that not all patients need but we deem that this procedure is necessary for this patient as it well help alleviate her gender dysphoria.         97861 (Fat grafting to malar / nasolabial fold facial regions): This patient had prolonged testosterone exposure resulting in male mid-face features with depressed soft tissues in the cheeks and nasolabial fold region. More fullness in the cheek and nasolabial fold region may be created with fat grafting from the abdomen or hips to the cheek and nasolabial fold region creating a more full, feminine appearance. The Saenz fat grafting technique with atraumatic harvest and grafting leads to a 70%+ take of fat grafting to this region and is suggested. This procedure will correct the hypoplastic cheeks snd deep nasolabial folds.   Needs a 3D CT scan and Virtual Surgical Planning. She will need to provide a letter from her therapist and hormone provider. Will need PCP clearance.  We had a 60 minute meeting with the patient discussing diagnosis and medical management issues and outcomes.  Patient seen in conjunction with Dr. Chace Lea.

## 2024-12-29 NOTE — REASON FOR VISIT
[Consultation] : a consultation visit [FreeTextEntry1] : Neil is a transgender woman who was assigned male at birth and has experienced gender dysphoria related to her facial features. She has been living as a woman and undergoing hormone therapy for the past two years. In 2024, she had bottom surgery performed by Dr. Jorge MD, and breast augmentation by Dr. Gavino MD, both at [facility]. Neil is a nonsmoker and has not used Botox, fillers, or silicone.

## 2024-12-29 NOTE — REASON FOR VISIT
[Consultation] : a consultation visit [FreeTextEntry1] : Neil is a transgender woman who was assigned male at birth and has experienced gender dysphoria related to her facial features. She has been living as a woman and undergoing hormone therapy for the past two years. In 2024, she had bottom surgery performed by Dr. Jorge MD, and breast augmentation by Dr. Gavino MD, both at [facility]. Neil is a nonsmoker and has not used Botox, fillers, or silicone.

## 2024-12-29 NOTE — HISTORY OF PRESENT ILLNESS
[FreeTextEntry1] : This marianne patient began transitioning since 8/1/2022. She has been on hormonal therapy consistently since 8/1/2022. She has been in therapy and was diagnosed with Gender Dysphoria concerned about certain facial features that appear masculine and cause bullying desires facial feminization surgery followed by Transgender team. The following facial features appear masculine and will need to be modified: -Brow -Nose -Jawline -Neck -cheeks -Lips   Allergies: - None   Meds: - Estrogen injection every 3 months. - Estrogen injection every 1 week. - Biktarvy  PMHx: - HIV+ - Depression   Surgical History Surgery Type: Bottom Surgery Location: [facility] Surgeon: Jorge COTTON Year: 2024 Complications?: "little tissue left over" - planning to have revision.  Surgery Type: Breast augmentation Location: [place], [place] Surgeon: MD Gavino Year: 2024 Complications?: None   Denies previous botox, fillers or silicone injections.  SH: Denies marijuana use, Denies Cigarette use   ROS: General health / Constitutional: no fever, no chills, no unusual weight changes, no energy level changes, no night sweats Skin: No color or pigmentation changes, no pruritis, no rashes, no ulcers, Hair: No changes in color, texture, distribution, loss Nails: No color changes, brittleness, infection Head: No headaches, no new jaw pain Eyes: Good visual acuity, no color blindness, no corrective lenses, no photophobia, no diplopia, no blurred vision, no infection, pain, no medications, Ear: no tinnitus, no discharge, no pain, no medications Nose: no epistaxis, no rhinorrhea, no rhinitis, no pain, Mouth & Throat: no gingivitis, no gingival bleeding, no ulcers, no voice changes, no changes in oral mucosa or tongue Neck no stiffness, no pain, no lymphadenitis, no thyroid disorders, Respiratory: no cough, no dyspnea, no wheezing, no chest pain, cyanosis, no pneumonia, no asthma, Cardiovascular: no chest pain, no palpitations, no irregular rhythm, no tachycardia, no bradycardia, no heart failure, no dyspnea on exertion ([place]), no edema Gastrointestinal: no nausea / vomiting, no dysphagia, no reflux / GERD, no abdominal pain, no jaundice Musculoskeletal: no pain in muscles, bones, or joints; no fractures, no dislocations, no muscular weakness, no atrophy Neurological: no paresis, no paralysis, no paresthesia, no seizures, no dizziness, no syncope, no ataxia, no tremor Psychological: no childhood behavioral problems, no irritability, no sleep changes Hematological: no anemia, no bruising, no bleeding tendencies.   PHYSICAL EXAM General: WDWN, in no distress, A & O x 3 (person, place, time) HEENT Head: AT/NC (atraumatic, normocephalic), including TMJ, sensory and motor; + Prominent brow and lateral orbital rim type III. Eyes: EOMI, PERRLA Ears: exterior, nl hearing, Nose: + slightly wide, bulbous nasal tip with acute nasolabial angle intranasal exam showed enlarged turbinates and deviated caudal septum Throat & mouth: gd palate elevation, nl tongue mobility, nl tonsil size; + Prominent mandibular angle with active masseter, boxy wide chin, Hypoplastic cheeks, Hypoplastic upper and lower lips, deep nasolabial folds. Neck: no masses, nl pulses, no prominent tracheal bulge ("Stew's Apple"), excess submental fat.     First stage FFS: 21139: Frontal sinus anterior wall setback 34268: Orbital reconstruction bilateral 54712: Hairline lowering 26875: Browlift bilateral 47175: Supraorbital bar recontouring bilateral 39823: Tarsorrhaphy bilateral 95031: Mandibular angle contouring 89356-52: Mandibular angle osteotomy 61614: Osseous genioplasty narrowing, shortening 53105: Rhinoplasty open 21351: Submucous resection of septum 11538: Cartilage grafting for nasal reconstruction, use of cadaveric cartilage for  grafts, columellar strut, tip graft 11834: Submental fat excision 55564: platysmaplasty 40799 x 2: Upper and lower lip augmentation with temporalis fascia 97745: Fat grafting to malar and nasolabial fold regions bilateral from abdomen first 25 ccs       21139 (Frontal sinus setback): Previous exposure to testosterone has led this patient to have a male appearing forehead with a more bossed shaped; this is opposed to a female appearing forehead that is more flat. A frontal sinus setback procedure will reshape the anterior wall of the frontal sinus by pushing back the bone and change the contour from convex (male-shape) to flat (female-shape). This surgical change will create a more flattened feminine brow appearance.          30630 (hairline lowering) and 14109 (Browlift): Previous exposure to testosterone has led to the patient having a male M-shaped hairline as opposed to a female upside-down U-shaped hairline. Her receded hairline also creates a high, broad male appearing forehead. Her low set eyebrows on top of her orbital roof rim give her a martinez, male-appearing look. Both of these male traits: a high hairline and low-set brows are causing her intense feelings of dysphoria. She would benefit from bilateral browlift and hairline lowering procedures. Raising her lateral eyebrow with a bilateral browlift will create a more female appearance. She has stressed a strong desire to wear her own natural hair and does not want to always have to wear a wig to cover up her male features.          29201 (bilateral orbital reconstruction): The bone growth that occurred during testosterone exposure in the upper half of each orbital has caused this patient to have male appearing orbital features that contribute to the sense of dysphoria she feels in public. Orbital reconstruction with a reciprocating saw for an L-shaped ostectomy, on both sides will help remove the excessive bony protrusion; this will be followed by bone material grafting and resorbable plate fixation. The bilateral orbital reconstruction will help alleviate these orbital and upper facial male features.          68563 (Supraorbital bar contouring): This patient has orbital lateral hooding or overhang of her lateral frontal bone which is typically associated with the male skull and orbits. Supraorbital contouring of this lateral orbital region with a pineapple alonzo will correct this male feature that is causing this patient dysphoria. These procedures also allows us to do a success browlift procedure since the overhang of bone can inhibit the upper movement of the brow and the removal of this allows for an effective lift.          17423 (Tarsorrhaphy): Bilateral tarsorrhaphy or surgical closure of both eyelids, after protective lacrilube or perilube ointment is applied, is necessary for the safety of the patients globes. With the brow reshaping and browlift procedure the upper eyelid will be pulled up so the globe will be exposed and unprotected during this long, proposed procedure. The tarsorrhaphies, allows both globes to be protected so the complications of corneal abrasions may be prevented.          72104-79 (mandibular angle osteotomy) and 21209 (mandibular angle contouring): This patient has an angular, more boxy jaw which results in male appearing lower face. She also has increased lower facial width related to her mandibular angle lateral projection. Mandibular angle resection and contouring will create a more feminine triangular jaw. By having a mandibular angle reduction through both of these procedures, the lower facial width is narrowed and this will create a V-shaped, feminine appearance of the jawline when viewed from the front.          58242 (osseous genioplasty): This patient has a wide, large chin that contributes to her feelings of gender dysphoria and being mis-gendered in public.  The patient would benefit from an osseous genioplasty that narrows and shortens the chin. The osseous genioplasty will help create a more feminine and more, slender chin.          58029 (Rhinoplasty): This patients nose has characteristics of a male nose. The male nose is often larger and wider with a more bulbous nasal tip. These male nasal features make her feel masculine which exacerbates her gender dysphoria. A rhinoplasty would be beneficial to feminize her nose by creating a smaller nose and more delicate, softer nasal tip. The lateral dorsal shape will also be feminized by the rhinoplasty.          08492 (Submucous resection of nose): This patients nasal septum is shaped like a male septum. The septum is the supportive pole that holds up the structure of the nasal pyramid. In this case the septum will also be used to provide cartilage tissue necessary for nasal grafts. This septoplasty is required to modify the septum to create a straight nose with good functional breathing while taking away the characteristics of a male nose.          72881 (Cartilage grafting for nasal reconstruction): Cartilage grafting is crucial for the performance of the feminizing rhinoplasty. To do that, cartilage grafts including, bilateral  grafts, a columellar strut graft and nasal tip graft are necessary.            08949 (Submental fat removal): Testosterone exposure will build fibrofatty tissue in the submental region that is not corrected by hormone therapy. This patient has this fibrofatty tissue in the submental region which has created a masculine lateral profile appearance. Direct submental fibrofatty tissue excision is necessary to correct this male feature.          97855 (Platysmaplasty): With prolonged testosterone exposure, the submental region will appear full and ptotic. This patient has a full and ptotic submental and neck region which is associated with a male-appearing neck. The female neck is slender and tight. The platsymaplasty, performed after submental fat excision, will help create a slender and tight, female appearing neck.          40799 x 2 (Upper and lower lip augmentation): Females are shown to have parsons lips than males, therefore, an upper and lower lip augmentation will create a more feminine appearance for this patient as she has currently has hypoplastic lips. Lip augmentation is also a procedure that not all patients need but we deem that this procedure is necessary for this patient as it well help alleviate her gender dysphoria.         65256 (Fat grafting to malar / nasolabial fold facial regions): This patient had prolonged testosterone exposure resulting in male mid-face features with depressed soft tissues in the cheeks and nasolabial fold region. More fullness in the cheek and nasolabial fold region may be created with fat grafting from the abdomen or hips to the cheek and nasolabial fold region creating a more full, feminine appearance. The Saenz fat grafting technique with atraumatic harvest and grafting leads to a 70%+ take of fat grafting to this region and is suggested. This procedure will correct the hypoplastic cheeks snd deep nasolabial folds.   Needs a 3D CT scan and Virtual Surgical Planning. She will need to provide a letter from her therapist and hormone provider. Will need PCP clearance.  We had a 60 minute meeting with the patient discussing diagnosis and medical management issues and outcomes.  Patient seen in conjunction with Dr. Chace Lea.

## 2025-01-20 ENCOUNTER — APPOINTMENT (OUTPATIENT)
Dept: CT IMAGING | Facility: CLINIC | Age: 40
End: 2025-01-20
Payer: COMMERCIAL

## 2025-01-20 ENCOUNTER — OUTPATIENT (OUTPATIENT)
Dept: OUTPATIENT SERVICES | Facility: HOSPITAL | Age: 40
LOS: 1 days | End: 2025-01-20

## 2025-01-20 PROCEDURE — 70486 CT MAXILLOFACIAL W/O DYE: CPT | Mod: 26
